# Patient Record
Sex: FEMALE | Race: BLACK OR AFRICAN AMERICAN
[De-identification: names, ages, dates, MRNs, and addresses within clinical notes are randomized per-mention and may not be internally consistent; named-entity substitution may affect disease eponyms.]

---

## 2020-01-30 ENCOUNTER — LABORATORY RESULT (OUTPATIENT)
Age: 50
End: 2020-01-30

## 2020-01-30 ENCOUNTER — APPOINTMENT (OUTPATIENT)
Dept: OTOLARYNGOLOGY | Facility: CLINIC | Age: 50
End: 2020-01-30
Payer: COMMERCIAL

## 2020-01-30 VITALS
HEIGHT: 69 IN | HEART RATE: 71 BPM | BODY MASS INDEX: 22.22 KG/M2 | SYSTOLIC BLOOD PRESSURE: 132 MMHG | WEIGHT: 150 LBS | DIASTOLIC BLOOD PRESSURE: 82 MMHG

## 2020-01-30 DIAGNOSIS — J32.3 CHRONIC SPHENOIDAL SINUSITIS: ICD-10-CM

## 2020-01-30 PROCEDURE — 31231 NASAL ENDOSCOPY DX: CPT

## 2020-01-30 PROCEDURE — 99204 OFFICE O/P NEW MOD 45 MIN: CPT | Mod: 25

## 2020-01-31 PROBLEM — J32.3 CHRONIC SPHENOIDAL SINUSITIS: Status: ACTIVE | Noted: 2020-01-31

## 2020-01-31 NOTE — ASSESSMENT
[FreeTextEntry1] : 49F here for initial evaluation, referred by Dr. Lester for chronic sinus disease.\par Over the past 5-6 months, pt has had worsening central headaches. MRI was done which showed sphenoid sinus disease and she was then sent to ENT who initially gave antibiotics with steroids after which the headaches improved. However, they returned shortly after finishing the meds and have remained despite an additional course of antibiotics/steroids. CT sinus shows complete opacification of both sphenoid sinuses with severe circumferential thickening and osteitic change to surrounding bone (right>>left). There are areas of central high attenuation within the sphenoid sinuses, which likely reflect inspissated secretions and fungal disease. The anterior drainage pathways are widely patent. On exam, nasal endoscopy shows polypoid edema of both sphenoethmoidal recesses with mild murky drainage from the right side.\par This pt has chronic bilateral sphenoid sinusitis. This has been present for several months, at least, given the remodelled very thickened bone on the CT, indicating chronicity. I would favor mycetoma, less likely allergic fungal. Treatment for this is surgical, to clean out inflammatory debris and create widely patent sphenoid ostia to facilitate drainage and access for topical rinses. The procedure was discussed at length and all questions answered. Plan for Or in the next 1-2 months. She may need antibiotics prior to OR should sx get acutely worse (superimposed bacterial infection).

## 2020-01-31 NOTE — PROCEDURE
[FreeTextEntry3] : Nasal Endoscopy:\par nasal airways patent\par polypoid edema of b/l (right>left) sphenoethmoidal recess w mild murkly drainage from the right, cx taken\par middle meati patent, no edema

## 2020-01-31 NOTE — DATA REVIEWED
[de-identified] : CT Sinus 20:\par Findings:\par The lamina papyracea are intact. Thinning of the bilateral cribriform plates are seen which may be demineralized\par or dehiscent.\par The fovea ethmoidalis are intact. Anteriorly, the left fovea ethmoidalis is lower than the right side.\par Both maxillary antra are well developed. There is minimal polypoid mucosal thickening within both maxillary\par antra. Mild mucosal thickening is seen at both maxillary ostia, left greater than right.\par There is no occlusion of either ostiomeatal unit. Prominent bilateral ethmoid bulla contributes narrowing of the\par middle meati I.\par There is wall thickening and sclerosis involving the bilateral sphenoid sinuses reflecting chronic inflammation.\par Pneumatization extends into the pterygoid recesses, right greater than left.\par Pneumatization extends superiorly on the right into the optic strut and anterior clinoid process. The right anterior\par clinoid process appear sclerotic as does the intersinus septum, interface with the carotid canals as well as the\par right pterygoid recess all in keeping with chronic inflammation. There is marked thinning of the intersinus septum\par which may be demineralized or dehiscent. The intersinus septum demonstrates a broad attachment on the\par medial aspect of the right carotid canal. Both carotid canals bulge into the posterior walls of both sphenoid\par sinuses. Both vidian canals protrude into the floor of the sphenoid sinuses with thinning of the roofs. A punctate\par lucency is seen along the superior/lateral wall the right sphenoid sinus which may reflect a prominent vessel or\par focal area of demineralization/dehiscence. There is complete occlusion of both sphenoid sinuses. Areas of high\par attenuation are seen centrally, which may reflect inspissated proteinaceous contents or superimposed fungal\par colonization. Clinical correlation is suggested to exclude allergic fungal sinusitis. There is widening and\par occlusion of both sphenoid ostia, right greater than left as well as the sphenoethmoidal recesses, right greater\par than left likely reflecting the presence of chronic polyps. Areas of dehiscence are seen along the margins of the\par right sphenoid ostium.\par The ethmoid labyrinth is well-developed. Scattered foamy secretions are seen within the ethmoid labyrinth\par including anteriorly and within the left posterior ethmoid air cells, which in the proper clinical setting may reflect an\par acute sinusitis. There is polypoid mucosal thickening occluding the right ethmoid labyrinth. Moderate polypoid\par mucosal thickening seen within the left posterior ethmoid labyrinth.\par Additional mild scattered areas of mucosal thickening are seen elsewhere within the anterior ethmoid labyrinth\par with areas appearing polypoid in nature. Areas of demineralization and sclerosis are seen within the posterior\par ethmoid labyrinth, right greater than left reflecting chronic inflammation.\par Both frontal sinuses are well developed. On the right, mild mucosal thickening is seen at the ostium and\par drainage pathway which appears slightly polypoid in nature but there is no occlusion of the right frontal drainage\par pathway. On the left, mild polypoid mucosal thickening is seen at the base the frontal sinus extending along the\par ostium and drainage pathway without occlusion.\par The cartilaginous nasal septum deviates to the left narrowing the left internal nasal valve. The osseous nasal\par septum deviates to the right along the anterior margin and to the left along the posterior margin contributing to\par areas of narrowing of the nasal cavity.\par Small bilateral kimberly lamella are seen, right greater than left with areas of mucosal thickening.\par Mucosal thickening is seen within the nasal cavity. Polypoid mucosal thickening occludes the bilateral olfactory\par recesses, right greater than left. Polypoid mucosal thickening extends inferiorly from the right olfactory recess\par Patient: TON MENSAH : 1970 Exam Date: 2020 Acc No: [id number] MRN: [id number]\par insinuating between the right superior turbinate and nasal septum. Polypoid mucosal thickening is seen along\par the inferior margins of both sphenoethmoidal recesses, right greater than left. Polypoid mucosal thickening is\par seen along the posterior walls of the nasal cavity.\par Minimal endodontal disease involves the endodontically treated left 2nd maxillary premolar. Areas of alveolar\par ridge atrophy are seen at a dental a sites.\par The mastoid air cells and tympanic cavities are clear.\par The visualized brain parenchyma is normal. Ossification is noted along the anterior falx.\par Mild symmetric bilateral proptosis is seen with mild proliferation of the retrobulbar fat. Please correlate with\par thyroid function tests.\par IMPRESSION\par Complete occlusion of the bilateral sphenoid sinuses with polypoid mucosal thickening widening and occluding\par the bilateral sphenoethmoidal recesses. There is evidence of chronic inflammation with areas of\par demineralization/dehiscence as outlined. Areas of central high attenuation are seen within the sphenoid sinuses,\par which may reflect inspissated proteinaceous secretions or superimposed fungal colonization. Clinical correlation\par is suggested to exclude an allergic fungal sinusitis.\par Polypoid mucosal thickening within the posterior ethmoid labyrinth, right greater than left.\par Foamy secretions within the ethmoid labyrinth, which in the proper clinical setting may reflect an acute sinusitis.\par Additional mild scattered polypoid paranasal sinus mucosal thickening as outlined.\par Nasal septal deviation.\par Sinonasal anatomic variants as above.\par Bilateral cribriform plates appear thin which may be demineralized or dehiscent.

## 2020-01-31 NOTE — CONSULT LETTER
[Dear  ___] : Dear  [unfilled], [Courtesy Letter:] : I had the pleasure of seeing your patient, [unfilled], in my office today. [Consult Closing:] : Thank you very much for allowing me to participate in the care of this patient.  If you have any questions, please do not hesitate to contact me. [Sincerely,] : Sincerely, [FreeTextEntry3] : Corey Vizcarra MD\par Department of Otolaryngology - Head and Neck Surgery\par [facility] [DrTiffanie  ___] : Dr. BOYD

## 2020-03-12 ENCOUNTER — APPOINTMENT (OUTPATIENT)
Dept: OTOLARYNGOLOGY | Facility: HOSPITAL | Age: 50
End: 2020-03-12

## 2022-09-08 NOTE — HISTORY OF PRESENT ILLNESS
[de-identified] : 49F here for initial evaluation.\par \par She was referred by Dr. Lester for chronic sinus disease.\par \par Over the past 5-6 months, pt has had headache which initially improved with NSAIDs, but worsened, until MRI done which showed sphenoid sinus disease. She was then sent to ENT who initially gave antibiotics with steroids after which the headaches improved. However, they returned shortly after finishing the antibiotics and have remained despite an additional course of antibiotics/steroids, whereupon CT done and pt referred here for further treatment.\par She has no known h/o sinusitis. No allergies.\par \par CT Sinus 1/27/20 (I reviewed images):\par -Complete opacification of bilateral sphenoid sinuses w severe thickening and osteitic change to surrounding bone (right>>left). Areas of central high attenuation are seen within the sphenoid sinuses, which may reflect inspissated proteinaceous secretions or superimposed fungal colonization.\par -Both sphenoethmoidal recesses are obstructed and right posterior ethmoid cells are opacified.\par -The anterior drainage pathways are patent without appreciable mucosal disease.\par \par ROS otherwise unremarkable. LLE/weight-bearing as tolerated